# Patient Record
Sex: MALE | Race: WHITE | NOT HISPANIC OR LATINO
[De-identification: names, ages, dates, MRNs, and addresses within clinical notes are randomized per-mention and may not be internally consistent; named-entity substitution may affect disease eponyms.]

---

## 2022-03-16 ENCOUNTER — NON-APPOINTMENT (OUTPATIENT)
Age: 72
End: 2022-03-16

## 2022-03-16 ENCOUNTER — APPOINTMENT (OUTPATIENT)
Dept: CARDIOLOGY | Facility: CLINIC | Age: 72
End: 2022-03-16
Payer: MEDICAID

## 2022-03-16 VITALS
DIASTOLIC BLOOD PRESSURE: 87 MMHG | WEIGHT: 164 LBS | TEMPERATURE: 97.8 F | BODY MASS INDEX: 25.74 KG/M2 | HEIGHT: 67 IN | OXYGEN SATURATION: 97 % | HEART RATE: 120 BPM | SYSTOLIC BLOOD PRESSURE: 135 MMHG

## 2022-03-16 VITALS — DIASTOLIC BLOOD PRESSURE: 60 MMHG | SYSTOLIC BLOOD PRESSURE: 120 MMHG

## 2022-03-16 DIAGNOSIS — I49.8 OTHER SPECIFIED CARDIAC ARRHYTHMIAS: ICD-10-CM

## 2022-03-16 DIAGNOSIS — F17.200 NICOTINE DEPENDENCE, UNSPECIFIED, UNCOMPLICATED: ICD-10-CM

## 2022-03-16 PROCEDURE — 99204 OFFICE O/P NEW MOD 45 MIN: CPT

## 2022-03-16 PROCEDURE — 93000 ELECTROCARDIOGRAM COMPLETE: CPT

## 2022-03-16 NOTE — DISCUSSION/SUMMARY
[FreeTextEntry1] : Pt noted to be in ventricular bigeminy\par Noted on exam with frequent extrasystoles\par Completely asymptomatic\par \par Needs TTE to rule out structural heart disease\par 4 day ZIO to assess PVC burden and rule out NSVT\par Ischemic eval likely CTA- no symptoms at present\par Consider beta blocker pending ZIO results

## 2022-03-16 NOTE — HISTORY OF PRESENT ILLNESS
[FreeTextEntry1] : 71M who presents for evaluation of ventricular bigeminy of ECG\par \par Pt had RN come to his house and was told his pulse was low\par Today he presents for evaluation and ECG notable for ventricular bigeminy with frequent PVC's\par Patient denies chest pain, shortness of breath, palpitations, dizziness, lightheadedness, syncope.\par Hospitalized in late 2019 with respiratory illness, prior to days of COVID\par 2 cups of coffee a day, 2 beers every day \par \par Current smoker. . Father  in his 50s, no known heart disease. \par \par ECG: SR with PVC's, bigeminy, \par \par Asa 81mg

## 2022-03-16 NOTE — PHYSICAL EXAM

## 2022-03-28 ENCOUNTER — NON-APPOINTMENT (OUTPATIENT)
Age: 72
End: 2022-03-28

## 2022-03-31 ENCOUNTER — APPOINTMENT (OUTPATIENT)
Dept: CARDIOLOGY | Facility: CLINIC | Age: 72
End: 2022-03-31
Payer: MEDICARE

## 2022-03-31 VITALS
SYSTOLIC BLOOD PRESSURE: 149 MMHG | HEIGHT: 67 IN | HEART RATE: 42 BPM | BODY MASS INDEX: 25.27 KG/M2 | DIASTOLIC BLOOD PRESSURE: 63 MMHG | WEIGHT: 161 LBS | TEMPERATURE: 97.8 F | OXYGEN SATURATION: 97 %

## 2022-03-31 VITALS — DIASTOLIC BLOOD PRESSURE: 60 MMHG | SYSTOLIC BLOOD PRESSURE: 138 MMHG

## 2022-03-31 DIAGNOSIS — I45.5 OTHER SPECIFIED HEART BLOCK: ICD-10-CM

## 2022-03-31 DIAGNOSIS — R07.89 OTHER CHEST PAIN: ICD-10-CM

## 2022-03-31 PROCEDURE — 99214 OFFICE O/P EST MOD 30 MIN: CPT

## 2022-03-31 NOTE — DISCUSSION/SUMMARY
[FreeTextEntry1] : Frequent ventricular ectopy both on exam and on ZIO, 15.8% PVC burden- no symptoms \par Ongoing L sided CP- shoulder pain, sounds MSK but must rule out CAD\par \par Will arrange for cardiac cath, pt states he cant travel to [facility] and asking for [facility], will refer there\par TTE\par Would like to initiate medical therapy for PVC's, hesitant to start AVN blockers given tendency for bradycardia and pauses- will likely refer to EP for consideration of ablation, after cath and echo\par \par

## 2022-03-31 NOTE — PHYSICAL EXAM

## 2022-03-31 NOTE — HISTORY OF PRESENT ILLNESS
[FreeTextEntry1] : 71M who presents for frequent ventricular ectopy, CP\par \par Pt seen 2 weeks ago, noted on ECG with bigeminy\par S/p 4 day ZIO patch notable for 15.8% PVC burden, runs of NSVT, PSVT, 3 second pause\par Earlier this week, was having some L sided CP radiating to L upper back. Recommended for cath but signed out AMA\par He continues to note L shoulder pain, L upper back pain. Worse with movement of arm\par Denies dyspnea, lightheadedness, palpitations, syncope \par \par Current smoker- quit since hospital discharge 2 days ago. . Father  in his 50s, no known heart disease. Sister just required a TAVR at 74\par \par ECG: SR with PVC's, bigeminy, \par \par Asa 81mg

## 2022-04-18 ENCOUNTER — APPOINTMENT (OUTPATIENT)
Dept: INTERNAL MEDICINE | Facility: CLINIC | Age: 72
End: 2022-04-18
Payer: MEDICARE

## 2022-04-18 PROCEDURE — 93306 TTE W/DOPPLER COMPLETE: CPT

## 2022-04-29 ENCOUNTER — NON-APPOINTMENT (OUTPATIENT)
Age: 72
End: 2022-04-29

## 2022-05-19 ENCOUNTER — APPOINTMENT (OUTPATIENT)
Dept: CARDIOLOGY | Facility: CLINIC | Age: 72
End: 2022-05-19

## 2022-06-07 ENCOUNTER — APPOINTMENT (OUTPATIENT)
Dept: CARDIOLOGY | Facility: CLINIC | Age: 72
End: 2022-06-07
Payer: MEDICARE

## 2022-06-07 VITALS
WEIGHT: 164 LBS | BODY MASS INDEX: 25.74 KG/M2 | TEMPERATURE: 97.8 F | OXYGEN SATURATION: 98 % | DIASTOLIC BLOOD PRESSURE: 77 MMHG | HEART RATE: 44 BPM | SYSTOLIC BLOOD PRESSURE: 132 MMHG | HEIGHT: 67 IN

## 2022-06-07 PROCEDURE — 99214 OFFICE O/P EST MOD 30 MIN: CPT

## 2022-06-07 NOTE — DISCUSSION/SUMMARY
[FreeTextEntry1] : CAD s/p PCI: On DAPT, clarify duration. Anticipate at least 6 months. Mild bleeding. Continue DAPT, lipitor. Residual nonobstructive Cx disease \par \par PVC's: 15% PVC burden on ZIO, still having frequent ectopy despite recent PCI. Mild global LV dysfunction, possibly PVC induced? Will send to EP for further evaluation- consider ablation \par \par RV 3M

## 2022-06-07 NOTE — HISTORY OF PRESENT ILLNESS
[FreeTextEntry1] : 71M who presents for follow up of CAD s/p PCI, frequent PVC's\par \par Sent for cardiac cath at NS 22, s/p XIANG to 80% mLAD\par Now on DAPT- notes occasional bruising, light nosebleeds\par Denies CP, dyspnea, lightheadedness\par Was put on metoprolol 12.5mg BID- now off \par \par HISTORY:\par \par Pt seen 2 weeks ago, noted on ECG with bigeminy\par S/p 4 day ZIO patch notable for 15.8% PVC burden, runs of NSVT, PSVT, 3 second pause\par Earlier this week, was having some L sided CP radiating to L upper back. Recommended for cath but signed out AMA\par He continues to note L shoulder pain, L upper back pain. Worse with movement of arm\par \par Current smoker. . Father  in his 50s, no known heart disease. Sister just required a TAVR at 74\par \par ECG: SR with PVC's, bigeminy, \par TTE: 45-50%, possible underestimation due to ectopy\par Cath 80% mLAD s/p SYNERGY (22), 50% dCx\par \par Asa 81mg\par Plavix 75mg\par Lipitor 40mg\par \par

## 2022-06-07 NOTE — PHYSICAL EXAM

## 2022-07-14 ENCOUNTER — APPOINTMENT (OUTPATIENT)
Dept: ELECTROPHYSIOLOGY | Facility: CLINIC | Age: 72
End: 2022-07-14

## 2022-09-07 ENCOUNTER — APPOINTMENT (OUTPATIENT)
Dept: CARDIOLOGY | Facility: CLINIC | Age: 72
End: 2022-09-07

## 2022-09-07 ENCOUNTER — NON-APPOINTMENT (OUTPATIENT)
Age: 72
End: 2022-09-07

## 2022-09-07 VITALS
DIASTOLIC BLOOD PRESSURE: 85 MMHG | WEIGHT: 164 LBS | HEART RATE: 72 BPM | OXYGEN SATURATION: 97 % | HEIGHT: 67 IN | SYSTOLIC BLOOD PRESSURE: 140 MMHG | BODY MASS INDEX: 25.74 KG/M2

## 2022-09-07 VITALS — SYSTOLIC BLOOD PRESSURE: 120 MMHG | DIASTOLIC BLOOD PRESSURE: 70 MMHG

## 2022-09-07 PROCEDURE — 99214 OFFICE O/P EST MOD 30 MIN: CPT

## 2022-09-07 PROCEDURE — 93000 ELECTROCARDIOGRAM COMPLETE: CPT

## 2022-09-07 RX ORDER — METOPROLOL SUCCINATE 25 MG/1
25 TABLET, EXTENDED RELEASE ORAL DAILY
Qty: 30 | Refills: 5 | Status: ACTIVE | COMMUNITY
Start: 2022-09-07 | End: 1900-01-01

## 2022-09-07 NOTE — PHYSICAL EXAM

## 2022-09-07 NOTE — DISCUSSION/SUMMARY
[FreeTextEntry1] : CAD s/p PCI: He thinks he is still taking DAPT. He notes one of his medications was d/c'ed, possibly metoprolol\par \par Cont ASA, plavix x at least 6 months\par Cont Lipitor\par Residual nonobstructive Cx disease \par \par PVC's: 15% PVC burden on ZIO, still having frequent ectopy despite recent PCI. Mild global LV dysfunction, possibly PVC induced? Will send to EP for further evaluation- consider ablation \par \par Mild LV dysfunction: With frequent PVC's will start Toprol 25mg. D/c'ed in past. Will need to add ACE/ARB as BP allows \par \par RV 3M

## 2022-09-07 NOTE — HISTORY OF PRESENT ILLNESS
[FreeTextEntry1] : 71M who presents for follow up of CAD s/p PCI, frequent PVC's\par \par Pt feels well without complaints\par Denies CP, dyspnea, lightheadedness, palpitations\par Had recommended EP eval for frequent ventricular ectopy, has not seen\par He reports seeing Dr Gore (interventional cardiology) who d/c'ed one of his medications, he is not sure which one\par \par HISTORY:\par \par Pt seen 2 weeks ago, noted on ECG with bigeminy\par S/p 4 day ZIO patch notable for 15.8% PVC burden, runs of NSVT, PSVT, 3 second pause\par Earlier this week, was having some L sided CP radiating to L upper back. Recommended for cath but signed out AMA\par Sent for cardiac cath at NS 22, s/p XIANG to 80% mLAD\par Was put on metoprolol 12.5mg BID- now off \par \par Current smoker. . Father  in his 50s, no known heart disease. Sister just required a TAVR at 74. Drinks 2 beers a day.\par \par ECG: SR with PVC's, bigeminy, \par TTE: 45-50%, possible underestimation due to ectopy\par Cath 80% mLAD s/p SYNERGY (22), 50% dCx\par \par Asa 81mg\par Plavix 75mg\par Lipitor 40mg\par \par

## 2022-10-06 RX ORDER — KRILL/OM-3/DHA/EPA/PHOSPHO/AST 1000-230MG
81 CAPSULE ORAL
Qty: 90 | Refills: 1 | Status: ACTIVE | COMMUNITY
Start: 1900-01-01 | End: 1900-01-01

## 2022-10-25 RX ORDER — KRILL/OM-3/DHA/EPA/PHOSPHO/AST 1000-230MG
81 CAPSULE ORAL
Qty: 90 | Refills: 0 | Status: DISCONTINUED | COMMUNITY
Start: 2022-06-07 | End: 2022-10-25

## 2022-12-05 ENCOUNTER — APPOINTMENT (OUTPATIENT)
Dept: CARDIOLOGY | Facility: CLINIC | Age: 72
End: 2022-12-05

## 2022-12-05 VITALS
DIASTOLIC BLOOD PRESSURE: 65 MMHG | OXYGEN SATURATION: 97 % | HEIGHT: 67 IN | WEIGHT: 165 LBS | SYSTOLIC BLOOD PRESSURE: 126 MMHG | HEART RATE: 75 BPM | BODY MASS INDEX: 25.9 KG/M2 | TEMPERATURE: 98.2 F

## 2022-12-05 DIAGNOSIS — I51.89 OTHER ILL-DEFINED HEART DISEASES: ICD-10-CM

## 2022-12-05 DIAGNOSIS — I49.3 VENTRICULAR PREMATURE DEPOLARIZATION: ICD-10-CM

## 2022-12-05 PROCEDURE — 99214 OFFICE O/P EST MOD 30 MIN: CPT

## 2022-12-05 RX ORDER — METOPROLOL TARTRATE 25 MG/1
25 TABLET, FILM COATED ORAL
Qty: 30 | Refills: 0 | Status: DISCONTINUED | COMMUNITY
Start: 2022-04-27

## 2022-12-05 NOTE — PHYSICAL EXAM

## 2022-12-05 NOTE — HISTORY OF PRESENT ILLNESS
[FreeTextEntry1] : 71M who presents for follow up of CAD s/p PCI, frequent PVC's\par \par Pt feels well without complaints\par Denies CP, dyspnea, lightheadedness, palpitations\par Had recommended EP eval for frequent ventricular ectopy, has not seen\par Seeing Dr. Gore \par Recent imaging for dizziness revealing 50% L ICA stenosis \par Tolerating DAPT without bleeding \par Quit smoking 2 months ago\par \par HISTORY:\par \par Pt noted on ECG with bigeminy\par S/p 4 day ZIO patch notable for 15.8% PVC burden, runs of NSVT, PSVT, 3 second pause\par Earlier this week, was having some L sided CP radiating to L upper back. Recommended for cath but signed out AMA\par Sent for cardiac cath at NS 22, s/p XIANG to 80% mLAD\par Was put on metoprolol 12.5mg BID- now off \par \par Quit smoking in 2022. . Father  in his 50s, no known heart disease. Sister just required a TAVR at 74. Drinks 2 beers a day.\par \par ECG: SR with PVC's, bigeminy, \par TTE: 45-50%, possible underestimation due to ectopy\par Cath 80% mLAD s/p SYNERGY (22), 50% dCx\par \par Asa 81mg\par Plavix 75mg\par (Lipitor 40mg)\par

## 2022-12-05 NOTE — DISCUSSION/SUMMARY
[FreeTextEntry1] : CAD s/p PCI: Remains on DAPT. \par \par Resume lipitor\par Continue DAPT for now\par Residual nonobstructive Cx disease \par \par PVC's: 15% PVC burden on ZIO, less ectopy noted on exam. Will repeat TTE. Consider repeat ZIO \par \par Mild LV dysfunction: Not taking Toprol. Repeat echo now post PCI, possibly less ventricular ectopy. If continued LV dysfunction, would consider therapy for PVC's \par \par RV 6M\par TTE

## 2022-12-23 ENCOUNTER — APPOINTMENT (OUTPATIENT)
Dept: INTERNAL MEDICINE | Facility: CLINIC | Age: 72
End: 2022-12-23

## 2023-04-17 ENCOUNTER — RX RENEWAL (OUTPATIENT)
Age: 73
End: 2023-04-17

## 2023-10-16 ENCOUNTER — RX RENEWAL (OUTPATIENT)
Age: 73
End: 2023-10-16

## 2023-11-27 ENCOUNTER — NON-APPOINTMENT (OUTPATIENT)
Age: 73
End: 2023-11-27

## 2023-11-27 ENCOUNTER — APPOINTMENT (OUTPATIENT)
Dept: INTERNAL MEDICINE | Facility: CLINIC | Age: 73
End: 2023-11-27
Payer: MEDICARE

## 2023-11-27 VITALS
HEIGHT: 67 IN | HEART RATE: 86 BPM | WEIGHT: 176 LBS | OXYGEN SATURATION: 96 % | BODY MASS INDEX: 27.62 KG/M2 | DIASTOLIC BLOOD PRESSURE: 70 MMHG | SYSTOLIC BLOOD PRESSURE: 116 MMHG

## 2023-11-27 DIAGNOSIS — R55 SYNCOPE AND COLLAPSE: ICD-10-CM

## 2023-11-27 PROCEDURE — 99205 OFFICE O/P NEW HI 60 MIN: CPT

## 2023-11-29 DIAGNOSIS — R05.9 COUGH, UNSPECIFIED: ICD-10-CM

## 2023-12-08 RX ORDER — BENZONATATE 100 MG/1
100 CAPSULE ORAL 3 TIMES DAILY
Qty: 30 | Refills: 0 | Status: ACTIVE | COMMUNITY
Start: 2023-11-29 | End: 1900-01-01

## 2024-03-14 ENCOUNTER — RX RENEWAL (OUTPATIENT)
Age: 74
End: 2024-03-14

## 2024-03-14 RX ORDER — ATORVASTATIN CALCIUM 40 MG/1
40 TABLET, FILM COATED ORAL
Qty: 90 | Refills: 0 | Status: ACTIVE | COMMUNITY
Start: 2023-04-17 | End: 1900-01-01

## 2024-04-04 ENCOUNTER — APPOINTMENT (OUTPATIENT)
Dept: FAMILY MEDICINE | Facility: CLINIC | Age: 74
End: 2024-04-04
Payer: MEDICARE

## 2024-04-04 VITALS
BODY MASS INDEX: 27 KG/M2 | DIASTOLIC BLOOD PRESSURE: 65 MMHG | WEIGHT: 172 LBS | TEMPERATURE: 97.9 F | HEIGHT: 67 IN | OXYGEN SATURATION: 95 % | RESPIRATION RATE: 15 BRPM | SYSTOLIC BLOOD PRESSURE: 107 MMHG | HEART RATE: 75 BPM

## 2024-04-04 DIAGNOSIS — Z98.61 ATHEROSCLEROTIC HEART DISEASE OF NATIVE CORONARY ARTERY W/OUT ANGINA PECTORIS: ICD-10-CM

## 2024-04-04 DIAGNOSIS — E78.5 HYPERLIPIDEMIA, UNSPECIFIED: ICD-10-CM

## 2024-04-04 DIAGNOSIS — Z00.00 ENCOUNTER FOR GENERAL ADULT MEDICAL EXAMINATION W/OUT ABNORMAL FINDINGS: ICD-10-CM

## 2024-04-04 DIAGNOSIS — I25.10 ATHEROSCLEROTIC HEART DISEASE OF NATIVE CORONARY ARTERY W/OUT ANGINA PECTORIS: ICD-10-CM

## 2024-04-04 DIAGNOSIS — I47.29 OTHER VENTRICULAR TACHYCARDIA: ICD-10-CM

## 2024-04-04 PROCEDURE — 99204 OFFICE O/P NEW MOD 45 MIN: CPT | Mod: 25

## 2024-04-04 PROCEDURE — 36415 COLL VENOUS BLD VENIPUNCTURE: CPT

## 2024-04-04 RX ORDER — AZITHROMYCIN 250 MG/1
250 TABLET, FILM COATED ORAL
Qty: 1 | Refills: 0 | Status: DISCONTINUED | COMMUNITY
Start: 2023-12-04 | End: 2024-04-04

## 2024-04-04 RX ORDER — CLOPIDOGREL BISULFATE 75 MG/1
75 TABLET, FILM COATED ORAL
Refills: 0 | Status: DISCONTINUED | COMMUNITY
End: 2024-04-04

## 2024-04-05 LAB
ALBUMIN SERPL ELPH-MCNC: 4.4 G/DL
ALP BLD-CCNC: 98 U/L
ALT SERPL-CCNC: 17 U/L
ANION GAP SERPL CALC-SCNC: 8 MMOL/L
AST SERPL-CCNC: 20 U/L
BASOPHILS # BLD AUTO: 0.04 K/UL
BASOPHILS NFR BLD AUTO: 0.7 %
BILIRUB SERPL-MCNC: 0.8 MG/DL
BUN SERPL-MCNC: 14 MG/DL
CALCIUM SERPL-MCNC: 9.1 MG/DL
CHLORIDE SERPL-SCNC: 100 MMOL/L
CHOLEST SERPL-MCNC: 116 MG/DL
CO2 SERPL-SCNC: 29 MMOL/L
CREAT SERPL-MCNC: 0.88 MG/DL
EGFR: 90 ML/MIN/1.73M2
EOSINOPHIL # BLD AUTO: 0.16 K/UL
EOSINOPHIL NFR BLD AUTO: 2.7 %
ESTIMATED AVERAGE GLUCOSE: 120 MG/DL
GLUCOSE SERPL-MCNC: 147 MG/DL
HBA1C MFR BLD HPLC: 5.8 %
HCT VFR BLD CALC: 45.3 %
HDLC SERPL-MCNC: 59 MG/DL
HGB BLD-MCNC: 14.9 G/DL
IMM GRANULOCYTES NFR BLD AUTO: 0.3 %
LDLC SERPL CALC-MCNC: 38 MG/DL
LYMPHOCYTES # BLD AUTO: 1.45 K/UL
LYMPHOCYTES NFR BLD AUTO: 24.7 %
MAN DIFF?: NORMAL
MCHC RBC-ENTMCNC: 32.5 PG
MCHC RBC-ENTMCNC: 32.9 GM/DL
MCV RBC AUTO: 98.9 FL
MONOCYTES # BLD AUTO: 0.49 K/UL
MONOCYTES NFR BLD AUTO: 8.3 %
NEUTROPHILS # BLD AUTO: 3.72 K/UL
NEUTROPHILS NFR BLD AUTO: 63.3 %
NONHDLC SERPL-MCNC: 57 MG/DL
PLATELET # BLD AUTO: 216 K/UL
POTASSIUM SERPL-SCNC: 4.5 MMOL/L
PROT SERPL-MCNC: 6.4 G/DL
RBC # BLD: 4.58 M/UL
RBC # FLD: 12.2 %
SODIUM SERPL-SCNC: 137 MMOL/L
TRIGL SERPL-MCNC: 101 MG/DL
TSH SERPL-ACNC: 2.1 UIU/ML
WBC # FLD AUTO: 5.88 K/UL

## 2024-11-25 ENCOUNTER — RX RENEWAL (OUTPATIENT)
Age: 74
End: 2024-11-25